# Patient Record
(demographics unavailable — no encounter records)

---

## 2024-11-04 NOTE — ASSESSMENT
[FreeTextEntry1] : Ms. Gross is a 24-year-old female with no PMH presents today for a hospital follow up visit for head pressure w associated bilateral vision changes, right facial tingling, and left arm felt heavy. Symptoms have improved and patient is back to baseline. NIHSS: 0, no focal neuro deficits. CTH and CTA showed no acute pathology. Suspect to be due to complex migraine, however since has no history of migraines and this is first episode, I would like to obtain further imaging as recommended by neurology attending in [facility] during admission.   Plan: - MRI and MRV of brain w and wo contrast -RTC 3 months

## 2024-11-04 NOTE — DATA REVIEWED
[de-identified] : HCT: IMPRESSION:  No CT evidence of large acute territorial infarct or acute intracranial pathology.    CT PERFUSION: No evidence of perfusion abnormality.  CTA HEAD: No evidence of flow-limiting stenosis, occlusion or aneurysm.  CTA NECK: No evidence of carotid or vertebral artery stenosis.  Incidental 1.3 cm right thyroid lobe nodule. Recommend nonemergent outpatient thyroid ultrasound for further evaluation.

## 2024-11-04 NOTE — HISTORY OF PRESENT ILLNESS
[FreeTextEntry1] : Ms. Gross is a 24-year-old female with no PMH presents today for a hospital follow up visit for head pressure w associated bilateral vision changes. She states her vision was going in and out, had right facial tingling, and left arm felt heavy. Pt states this is the first time these sx happened, denies hx of migraines. Stroke code was called. CT head, and CTA H & N was normal. Patient was advised to get MRI head and MRV with and without contrast but since the patient felt better she opted to have it done outpatient.  Patient states she feels fine now, back to baseline, no reoccurring symptoms.

## 2024-11-04 NOTE — PHYSICAL EXAM
[FreeTextEntry1] : -Mental status: Awake, alert, oriented to person, place, and time. Speech is fluent with intact naming, repetition, and comprehension, no dysarthria. Recent and remote memory intact. Follows commands. Attention/concentration intact. Fund of knowledge appropriate. -Cranial nerves: II: Visual fields are full to confrontation. III, IV, VI: Extraocular movements are intact without nystagmus. Pupils equally round and reactive to light V: Facial sensation V1-V3 equal and intact VII: Face is symmetric with normal eye closure and smile VIII: Hearing is bilaterally intact to finger rub IX, X: Uvula is midline and soft palate rises symmetrically XI: Head turning and shoulder shrug are intact. XII: Tongue protrudes midline Motor: Normal bulk and tone. No pronator drift. Strength bilateral upper extremity 5/5, bilateral lower extremities 5/5. Rapid alternating movements intact and symmetric Sensation: Intact to light touch bilaterally. No neglect or extinction on double simultaneous testing. Coordination: No dysmetria on finger-to-nose and heel-to-shin bilaterally Gait: Narrow gait and steady